# Patient Record
Sex: MALE | Race: BLACK OR AFRICAN AMERICAN | NOT HISPANIC OR LATINO | URBAN - METROPOLITAN AREA
[De-identification: names, ages, dates, MRNs, and addresses within clinical notes are randomized per-mention and may not be internally consistent; named-entity substitution may affect disease eponyms.]

---

## 2019-06-18 ENCOUNTER — EMERGENCY (EMERGENCY)
Facility: HOSPITAL | Age: 24
LOS: 1 days | Discharge: HOME | End: 2019-06-18
Admitting: EMERGENCY MEDICINE
Payer: COMMERCIAL

## 2019-06-18 VITALS
RESPIRATION RATE: 16 BRPM | HEART RATE: 99 BPM | DIASTOLIC BLOOD PRESSURE: 96 MMHG | SYSTOLIC BLOOD PRESSURE: 142 MMHG | TEMPERATURE: 100 F | OXYGEN SATURATION: 98 %

## 2019-06-18 DIAGNOSIS — M53.3 SACROCOCCYGEAL DISORDERS, NOT ELSEWHERE CLASSIFIED: ICD-10-CM

## 2019-06-18 DIAGNOSIS — L03.317 CELLULITIS OF BUTTOCK: ICD-10-CM

## 2019-06-18 PROCEDURE — 99283 EMERGENCY DEPT VISIT LOW MDM: CPT | Mod: 25

## 2019-06-18 RX ORDER — BACITRACIN ZINC 500 UNIT/G
1 OINTMENT IN PACKET (EA) TOPICAL ONCE
Refills: 0 | Status: COMPLETED | OUTPATIENT
Start: 2019-06-18 | End: 2019-06-18

## 2019-06-18 RX ADMIN — Medication 100 MILLIGRAM(S): at 01:48

## 2019-06-18 RX ADMIN — Medication 1 APPLICATION(S): at 01:48

## 2019-06-18 NOTE — ED PROVIDER NOTE - NS ED ROS FT
· CONSTITUTIONAL: no fever and no chills.  · CARDIOVASCULAR: normal rate and rhythm, no chest pain and no edema.  · RESPIRATORY: no chest pain, no cough, and no shortness of breath.  · GASTROINTESTINAL: no abdominal pain, no bloating, no constipation, no diarrhea, no nausea and no vomiting.  · MUSCULOSKELETAL: no back pain, no musculoskeletal pain, no neck pain, and no weakness.  · SKIN: +cellulitis, no abrasions, no jaundice, no lesions, no pruritis, and no rashes.  · NEURO: no loss of consciousness, no gait abnormality, no headache, no sensory deficits, and no weakness.  · PSYCHIATRIC: no known mental health issues.

## 2019-06-18 NOTE — ED PROVIDER NOTE - PHYSICAL EXAMINATION
VITAL SIGNS: I have reviewed nursing notes and confirm.  CONSTITUTIONAL: Well-developed; well-nourished; in no acute distress.  SKIN: Skin is warm and dry, no acute rash.  HEAD: Normocephalic; atraumatic.  EYES: PERRL, EOM intact; conjunctiva and sclera clear.  ENT: No nasal discharge; airway clear.  NECK: Supple; non tender.  CARD: S1, S2 normal; no murmurs, gallops, or rubs. Regular rate and rhythm.  RESP: No wheezes, rales or rhonchi.  ABD: Normal bowel sounds; soft; non-distended; non-tender;  no palpable or pulsating mass; no hepatosplenomegaly.  : erythema between gluteal cleft, no abscess, no discharge.   chaperoned by Arlin CHUA RN   EXT: Normal ROM. No clubbing, cyanosis or edema.  NEURO: Alert, oriented. Grossly unremarkable.  PSYCH: Cooperative, appropriate.

## 2019-06-18 NOTE — ED PROVIDER NOTE - CLINICAL SUMMARY MEDICAL DECISION MAKING FREE TEXT BOX
cellulitis between gluteal area.  inflammation of skin s/p using johnson one week ago, now with cellulitis. will treat with abx.  wound check in 48 hours. no abscess.

## 2019-06-18 NOTE — ED PROVIDER NOTE - OBJECTIVE STATEMENT
23 year old male with no PMhx presents with burn to in between gluteal area x one week. patient reports one week ago he used johnson and left it on too long. now reports pain to area between gluteals. no fever, no streaking. no abscess or drainage.   Denies sore throat, cough, SOB, CP, palpitations, wheezing, abdominal pain, N/V/D/C, change in urinary/bowel function, dysuria, hematuria, flank pain, malaise, rash, HA, and dizziness.  No recent travel or sick contact noted.

## 2023-05-30 ENCOUNTER — OFFICE VISIT (OUTPATIENT)
Dept: URBAN - METROPOLITAN AREA CLINIC 109 | Facility: CLINIC | Age: 28
End: 2023-05-30
Payer: COMMERCIAL

## 2023-05-30 ENCOUNTER — WEB ENCOUNTER (OUTPATIENT)
Dept: URBAN - METROPOLITAN AREA CLINIC 109 | Facility: CLINIC | Age: 28
End: 2023-05-30

## 2023-05-30 VITALS
HEART RATE: 67 BPM | HEIGHT: 70 IN | SYSTOLIC BLOOD PRESSURE: 107 MMHG | TEMPERATURE: 97.9 F | WEIGHT: 176.6 LBS | DIASTOLIC BLOOD PRESSURE: 68 MMHG | BODY MASS INDEX: 25.28 KG/M2

## 2023-05-30 DIAGNOSIS — K62.89 ANAL PAIN: ICD-10-CM

## 2023-05-30 PROCEDURE — 99204 OFFICE O/P NEW MOD 45 MIN: CPT | Performed by: INTERNAL MEDICINE

## 2023-05-30 PROCEDURE — 46600 DIAGNOSTIC ANOSCOPY SPX: CPT | Performed by: INTERNAL MEDICINE

## 2023-05-30 RX ORDER — DOXYCYCLINE HYCLATE 100 MG/1
1 CAPSULE CAPSULE ORAL
Qty: 14 CAPSULE | Refills: 0 | OUTPATIENT
Start: 2023-05-30 | End: 2023-06-06

## 2023-05-30 RX ORDER — CEFTRIAXONE 500 MG/1
INJECT 500MG IM INJECTION, POWDER, FOR SOLUTION INTRAMUSCULAR; INTRAVENOUS ONCE
Qty: 0.5 GRAM | Refills: 0 | OUTPATIENT
Start: 2023-05-30 | End: 2023-05-31

## 2023-05-30 NOTE — HPI-TODAY'S VISIT:
patient reports about 2 weeks ago was constipated, had severe anal pain and rectal bleeding, went to urgent care x 2 , was told they didn't see anything -- was given suppository which didn't help, then was given pain Rx  eventually symptoms resolved Last BM was 2 nights ago, still bleeding Last pain was 3 days ago  normally has BM every 1-2 days with fiber and stool softeners Reports doesn't eat healthy / not enough fiber but does workout a lot

## 2023-06-07 ENCOUNTER — CLAIMS CREATED FROM THE CLAIM WINDOW (OUTPATIENT)
Dept: URBAN - METROPOLITAN AREA CLINIC 109 | Facility: CLINIC | Age: 28
End: 2023-06-07
Payer: COMMERCIAL

## 2023-06-07 ENCOUNTER — LAB OUTSIDE AN ENCOUNTER (OUTPATIENT)
Dept: URBAN - METROPOLITAN AREA CLINIC 109 | Facility: CLINIC | Age: 28
End: 2023-06-07

## 2023-06-07 VITALS
HEIGHT: 70 IN | WEIGHT: 176.6 LBS | DIASTOLIC BLOOD PRESSURE: 81 MMHG | TEMPERATURE: 97.9 F | HEART RATE: 87 BPM | SYSTOLIC BLOOD PRESSURE: 128 MMHG | BODY MASS INDEX: 25.28 KG/M2

## 2023-06-07 DIAGNOSIS — K62.89 RECTAL PAIN: ICD-10-CM

## 2023-06-07 DIAGNOSIS — K62.5 RECTAL BLEEDING: ICD-10-CM

## 2023-06-07 PROCEDURE — 99214 OFFICE O/P EST MOD 30 MIN: CPT | Performed by: INTERNAL MEDICINE

## 2023-06-07 RX ORDER — POLYETHYLENE GLYCOL 3350, SODIUM SULFATE ANHYDROUS, SODIUM BICARBONATE, SODIUM CHLORIDE, POTASSIUM CHLORIDE 236; 22.74; 6.74; 5.86; 2.97 G/4L; G/4L; G/4L; G/4L; G/4L
ML POWDER, FOR SOLUTION ORAL 1
Qty: 1 | Refills: 0 | OUTPATIENT
Start: 2023-06-07 | End: 2023-06-08

## 2023-06-07 NOTE — HPI-TODAY'S VISIT:
Mr. Wilson is a 26 y/o M who is here for f/u from [facility] w/ Dr. Brooke. At [facility] pt was treated with abx and if not doing better today, Dr. Brooke recommended colonoscopy eval. Today pt reports that he did not  his Rx as he was unaware something was sent in. He reports increased bleeding and pain since LOV. He reports monday he had significant rectal bleeding. He reports x4 large episodes of passing blood w/o stool. That has has persisted and he continue to have signifcant rectal bleeding with clots. He states he went to [facility] yesterday and they refused to give him a GI consult to he left AMA. Hemoglobin at [facility] was 14.1. While pt was here, he has had x4 episodes of hematochezia. He is in severe pain with sitting and movement.   OV 5/30/23: patient reports about 2 weeks ago was constipated, had severe anal pain and rectal bleeding, went to urgent care x 2 , was told they didn't see anything -- was given suppository which didn't help, then was given pain Rx  eventually symptoms resolved Last BM was 2 nights ago, still bleeding Last pain was 3 days ago  normally has BM every 1-2 days with fiber and stool softeners Reports doesn't eat healthy / not enough fiber but does workout a lot

## 2023-06-08 ENCOUNTER — CLAIMS CREATED FROM THE CLAIM WINDOW (OUTPATIENT)
Dept: URBAN - METROPOLITAN AREA MEDICAL CENTER 16 | Facility: MEDICAL CENTER | Age: 28
End: 2023-06-08

## 2023-06-08 ENCOUNTER — CLAIMS CREATED FROM THE CLAIM WINDOW (OUTPATIENT)
Dept: URBAN - METROPOLITAN AREA MEDICAL CENTER 16 | Facility: MEDICAL CENTER | Age: 28
End: 2023-06-08
Payer: COMMERCIAL

## 2023-06-08 DIAGNOSIS — K62.89 PROCTITIS: ICD-10-CM

## 2023-06-08 DIAGNOSIS — R93.3 ABN FINDINGS-GI TRACT: ICD-10-CM

## 2023-06-08 PROCEDURE — 99222 1ST HOSP IP/OBS MODERATE 55: CPT | Performed by: INTERNAL MEDICINE

## 2023-06-08 PROCEDURE — 99232 SBSQ HOSP IP/OBS MODERATE 35: CPT | Performed by: INTERNAL MEDICINE

## 2023-06-08 PROCEDURE — G8427 DOCREV CUR MEDS BY ELIG CLIN: HCPCS | Performed by: INTERNAL MEDICINE

## 2023-06-10 ENCOUNTER — OUT OF OFFICE VISIT (OUTPATIENT)
Dept: URBAN - METROPOLITAN AREA MEDICAL CENTER 16 | Facility: MEDICAL CENTER | Age: 28
End: 2023-06-10
Payer: COMMERCIAL

## 2023-06-10 DIAGNOSIS — K62.89 PROCTITIS: ICD-10-CM

## 2023-06-10 DIAGNOSIS — K62.5 ANAL BLEEDING: ICD-10-CM

## 2023-06-10 DIAGNOSIS — K52.3 COLITIS, INDETERMINATE: ICD-10-CM

## 2023-06-10 PROCEDURE — 99232 SBSQ HOSP IP/OBS MODERATE 35: CPT | Performed by: INTERNAL MEDICINE

## 2023-06-13 ENCOUNTER — OFFICE VISIT (OUTPATIENT)
Dept: URBAN - METROPOLITAN AREA SURGERY CENTER 23 | Facility: SURGERY CENTER | Age: 28
End: 2023-06-13

## 2023-06-26 ENCOUNTER — DASHBOARD ENCOUNTERS (OUTPATIENT)
Age: 28
End: 2023-06-26

## 2023-06-26 ENCOUNTER — OFFICE VISIT (OUTPATIENT)
Dept: URBAN - METROPOLITAN AREA CLINIC 109 | Facility: CLINIC | Age: 28
End: 2023-06-26
Payer: COMMERCIAL

## 2023-06-26 VITALS
BODY MASS INDEX: 26.31 KG/M2 | WEIGHT: 183.8 LBS | HEART RATE: 79 BPM | SYSTOLIC BLOOD PRESSURE: 125 MMHG | HEIGHT: 70 IN | TEMPERATURE: 98.1 F | DIASTOLIC BLOOD PRESSURE: 67 MMHG

## 2023-06-26 DIAGNOSIS — K92.1 HEMATOCHEZIA: ICD-10-CM

## 2023-06-26 DIAGNOSIS — K64.9 ACUTE HEMORRHOID: ICD-10-CM

## 2023-06-26 DIAGNOSIS — K64.8 OTHER HEMORRHOIDS: ICD-10-CM

## 2023-06-26 PROCEDURE — 99213 OFFICE O/P EST LOW 20 MIN: CPT | Performed by: INTERNAL MEDICINE

## 2023-06-26 RX ORDER — LIDOCAINE AND PRILOCAINE 25; 25 MG/G; MG/G
CREAM TOPICAL
Qty: 30 GRAM | Status: ACTIVE | COMMUNITY

## 2023-06-26 RX ORDER — DOXYCYCLINE HYCLATE 100 MG/1
TABLET ORAL
Qty: 10 TABLET | Status: ACTIVE | COMMUNITY

## 2023-06-26 RX ORDER — HYDROCORTISONE 10 MG/G
ML CREAM TOPICAL TWICE A DAY
Qty: 1 | Refills: 0 | OUTPATIENT
Start: 2023-06-26 | End: 2023-07-06

## 2023-06-26 NOTE — PHYSICAL EXAM NEUROLOGIC:
oriented to person, place and time , normal sensation , short and long term memory intact  (0) Alert; keenly responsive

## 2023-06-26 NOTE — HPI-TODAY'S VISIT:
Mr. Wilson is a 28 y/o M who is here for f/u from [facility] admission. At LOV pt was sent to [facility] and admitted for hematochezia. He was monitored for a few days until rectal bleeding stopped, then GI signed off. Hemoglobin got down to 8's and he was swabbed for rectal STD. Swab came back positive and was treated with abx. Today he reports that his rectal bleeding has stopped and that he is feeling back to his usual self. He reports that he lost x10lbs during his most recent episode of hematochezia, but it now back up 8lbs. He has been able to return to work without issues. He reports finishing his abx fo STD, but has not gone to a STD clinic yet.  OV 6/7/23: Mr. Wilson is a 28 y/o M who is here for f/u from [facility] w/ Dr. Brooke. At [facility] pt was treated with abx and if not doing better today, Dr. Brooke recommended colonoscopy eval. Today pt reports that he did not  his Rx as he was unaware something was sent in. He reports increased bleeding and pain since LOV. He reports monday he had significant rectal bleeding. He reports x4 large episodes of passing blood w/o stool. That has has persisted and he continue to have signifcant rectal bleeding with clots. He states he went to [facility] yesterday and they refused to give him a GI consult to he left AMA. Hemoglobin at [facility] was 14.1. While pt was here, he has had x4 episodes of hematochezia. He is in severe pain with sitting and movement.   OV 5/30/23: patient reports about 2 weeks ago was constipated, had severe anal pain and rectal bleeding, went to urgent care x 2 , was told they didn't see anything -- was given suppository which didn't help, then was given pain Rx  eventually symptoms resolved Last BM was 2 nights ago, still bleeding Last pain was 3 days ago  normally has BM every 1-2 days with fiber and stool softeners Reports doesn't eat healthy / not enough fiber but does workout a lot

## 2023-08-15 ENCOUNTER — OFFICE VISIT (OUTPATIENT)
Dept: URBAN - METROPOLITAN AREA CLINIC 118 | Facility: CLINIC | Age: 28
End: 2023-08-15

## 2024-12-03 NOTE — ED PROVIDER NOTE - NSFOLLOWUPINSTRUCTIONS_ED_ALL_ED_FT
Detail Level: Detailed take antibiotics as prescribed. follow up for wound check in 48 hours. keep area clean and dry.     Cellulitis    Cellulitis is a skin infection caused by bacteria. This condition occurs most often in the arms and lower legs but can occur anywhere over the body. Symptoms include redness, swelling, warm skin, tenderness, and chills/fever. If you were prescribed an antibiotic medicine, take it as told by your health care provider. Do not stop taking the antibiotic even if you start to feel better.    SEEK IMMEDIATE MEDICAL CARE IF YOU HAVE ANY OF THE FOLLOWING SYMPTOMS: worsening fever, red streaks coming from affected area, vomiting or diarrhea, or dizziness/lightheadedness.